# Patient Record
Sex: FEMALE | Race: WHITE | NOT HISPANIC OR LATINO | URBAN - METROPOLITAN AREA
[De-identification: names, ages, dates, MRNs, and addresses within clinical notes are randomized per-mention and may not be internally consistent; named-entity substitution may affect disease eponyms.]

---

## 2017-01-19 ENCOUNTER — COMMUNICATION - HEALTHEAST (OUTPATIENT)
Dept: HEALTH INFORMATION MANAGEMENT | Facility: CLINIC | Age: 37
End: 2017-01-19

## 2017-03-21 ENCOUNTER — OFFICE VISIT - HEALTHEAST (OUTPATIENT)
Dept: FAMILY MEDICINE | Facility: CLINIC | Age: 37
End: 2017-03-21

## 2017-03-21 DIAGNOSIS — J02.0 STREP THROAT: ICD-10-CM

## 2017-03-21 RX ORDER — FLUOXETINE 40 MG/1
CAPSULE ORAL
Status: SHIPPED | COMMUNITY
Start: 2017-01-25

## 2017-07-25 LAB
ABORH_EXT (HISTORICAL CONVERSION): NORMAL
ANTIBODY_EXT (HISTORICAL CONVERSION): NEGATIVE
HBSAG_EXT (HISTORICAL CONVERSION): NORMAL
HGB_EXT (HISTORICAL CONVERSION): 13.1
HIV_EXT: NORMAL
PLT_EXT - HISTORICAL: 386
RPR - HISTORICAL: NORMAL
RUBELLA_EXT (HISTORICAL CONVERSION): NORMAL

## 2018-01-05 ENCOUNTER — RECORDS - HEALTHEAST (OUTPATIENT)
Dept: LAB | Facility: CLINIC | Age: 38
End: 2018-01-05

## 2018-01-05 LAB
ALT SERPL W P-5'-P-CCNC: 15 U/L (ref 0–45)
AST SERPL W P-5'-P-CCNC: 20 U/L (ref 0–40)
BASOPHILS # BLD AUTO: 0.1 THOU/UL (ref 0–0.2)
BASOPHILS NFR BLD AUTO: 1 % (ref 0–2)
CREAT SERPL-MCNC: 0.55 MG/DL (ref 0.6–1.1)
CREAT UR-MCNC: 77.9 MG/DL
EOSINOPHIL # BLD AUTO: 0.1 THOU/UL (ref 0–0.4)
EOSINOPHIL NFR BLD AUTO: 1 % (ref 0–6)
ERYTHROCYTE [DISTWIDTH] IN BLOOD BY AUTOMATED COUNT: 11.2 % (ref 11–14.5)
GFR SERPL CREATININE-BSD FRML MDRD: >60 ML/MIN/1.73M2
HCT VFR BLD AUTO: 34.2 % (ref 35–47)
HGB BLD-MCNC: 11.9 G/DL (ref 12–16)
LYMPHOCYTES # BLD AUTO: 2.7 THOU/UL (ref 0.8–4.4)
LYMPHOCYTES NFR BLD AUTO: 24 % (ref 20–40)
MCH RBC QN AUTO: 30 PG (ref 27–34)
MCHC RBC AUTO-ENTMCNC: 34.8 G/DL (ref 32–36)
MCV RBC AUTO: 86 FL (ref 80–100)
MONOCYTES # BLD AUTO: 0.7 THOU/UL (ref 0–0.9)
MONOCYTES NFR BLD AUTO: 6 % (ref 2–10)
NEUTROPHILS # BLD AUTO: 7.7 THOU/UL (ref 2–7.7)
NEUTROPHILS NFR BLD AUTO: 69 % (ref 50–70)
PLATELET # BLD AUTO: 355 THOU/UL (ref 140–440)
PMV BLD AUTO: 10.1 FL (ref 8.5–12.5)
PROTEIN, RANDOM URINE - HISTORICAL: 17 MG/DL
PROTEIN/CREAT RATIO, RANDOM UR: 0.22
RBC # BLD AUTO: 3.97 MILL/UL (ref 3.8–5.4)
URATE SERPL-MCNC: 3.4 MG/DL (ref 2–7.5)
WBC: 11.3 THOU/UL (ref 4–11)

## 2018-01-12 ENCOUNTER — HOSPITAL ENCOUNTER (OUTPATIENT)
Dept: OBGYN | Facility: CLINIC | Age: 38
Discharge: HOME | End: 2018-01-13
Attending: OBSTETRICS & GYNECOLOGY | Admitting: OBSTETRICS & GYNECOLOGY

## 2018-01-12 DIAGNOSIS — O13.9 GESTATIONAL HYPERTENSION: ICD-10-CM

## 2018-01-12 DIAGNOSIS — Z33.1 PREGNANT STATE, INCIDENTAL: ICD-10-CM

## 2018-01-12 LAB
ABO/RH(D): NORMAL
ALT SERPL W P-5'-P-CCNC: 15 U/L (ref 0–45)
ANTIBODY SCREEN: NEGATIVE
APTT PPP: 25 SECONDS (ref 24–37)
AST SERPL W P-5'-P-CCNC: 20 U/L (ref 0–40)
CREAT SERPL-MCNC: 0.55 MG/DL (ref 0.6–1.1)
CREAT UR-MCNC: 77.4 MG/DL
ERYTHROCYTE [DISTWIDTH] IN BLOOD BY AUTOMATED COUNT: 11.3 % (ref 11–14.5)
GFR SERPL CREATININE-BSD FRML MDRD: >60 ML/MIN/1.73M2
HCT VFR BLD AUTO: 35.2 % (ref 35–47)
HGB BLD-MCNC: 11.8 G/DL (ref 12–16)
INR PPP: 0.96 (ref 0.9–1.1)
MCH RBC QN AUTO: 29.1 PG (ref 27–34)
MCHC RBC AUTO-ENTMCNC: 33.5 G/DL (ref 32–36)
MCV RBC AUTO: 87 FL (ref 80–100)
PLATELET # BLD AUTO: 348 THOU/UL (ref 140–440)
PMV BLD AUTO: 9.8 FL (ref 8.5–12.5)
PROTEIN, RANDOM URINE - HISTORICAL: 24 MG/DL
PROTEIN/CREAT RATIO, RANDOM UR: 0.31
RBC # BLD AUTO: 4.05 MILL/UL (ref 3.8–5.4)
URATE SERPL-MCNC: 3.4 MG/DL (ref 2–7.5)
WBC: 11.9 THOU/UL (ref 4–11)

## 2018-01-12 ASSESSMENT — MIFFLIN-ST. JEOR: SCORE: 1422.04

## 2018-01-13 LAB
ALT SERPL W P-5'-P-CCNC: 13 U/L (ref 0–45)
APTT PPP: 26 SECONDS (ref 24–37)
AST SERPL W P-5'-P-CCNC: 14 U/L (ref 0–40)
CREAT SERPL-MCNC: 0.54 MG/DL (ref 0.6–1.1)
CREAT UR-MCNC: 129.8 MG/DL
ERYTHROCYTE [DISTWIDTH] IN BLOOD BY AUTOMATED COUNT: 11.5 % (ref 11–14.5)
GFR SERPL CREATININE-BSD FRML MDRD: >60 ML/MIN/1.73M2
HCT VFR BLD AUTO: 32.7 % (ref 35–47)
HGB BLD-MCNC: 11.1 G/DL (ref 12–16)
INR PPP: 0.99 (ref 0.9–1.1)
MCH RBC QN AUTO: 29.2 PG (ref 27–34)
MCHC RBC AUTO-ENTMCNC: 33.9 G/DL (ref 32–36)
MCV RBC AUTO: 86 FL (ref 80–100)
PLATELET # BLD AUTO: 328 THOU/UL (ref 140–440)
PMV BLD AUTO: 10 FL (ref 8.5–12.5)
PROTEIN, RANDOM URINE - HISTORICAL: 29 MG/DL
PROTEIN/CREAT RATIO, RANDOM UR: 0.22
RBC # BLD AUTO: 3.8 MILL/UL (ref 3.8–5.4)
URATE SERPL-MCNC: 3.4 MG/DL (ref 2–7.5)
WBC: 9.8 THOU/UL (ref 4–11)

## 2018-01-15 ENCOUNTER — RECORDS - HEALTHEAST (OUTPATIENT)
Dept: LAB | Facility: CLINIC | Age: 38
End: 2018-01-15

## 2018-01-15 LAB
ALT SERPL W P-5'-P-CCNC: 14 U/L (ref 0–45)
AST SERPL W P-5'-P-CCNC: 17 U/L (ref 0–40)
BASOPHILS # BLD AUTO: 0.1 THOU/UL (ref 0–0.2)
BASOPHILS NFR BLD AUTO: 0 % (ref 0–2)
CREAT SERPL-MCNC: 0.58 MG/DL (ref 0.6–1.1)
CREAT UR-MCNC: 162.2 MG/DL
EOSINOPHIL # BLD AUTO: 0.1 THOU/UL (ref 0–0.4)
EOSINOPHIL NFR BLD AUTO: 1 % (ref 0–6)
ERYTHROCYTE [DISTWIDTH] IN BLOOD BY AUTOMATED COUNT: 11.5 % (ref 11–14.5)
GFR SERPL CREATININE-BSD FRML MDRD: >60 ML/MIN/1.73M2
HCT VFR BLD AUTO: 34.7 % (ref 35–47)
HGB BLD-MCNC: 11.7 G/DL (ref 12–16)
LYMPHOCYTES # BLD AUTO: 2.7 THOU/UL (ref 0.8–4.4)
LYMPHOCYTES NFR BLD AUTO: 20 % (ref 20–40)
MCH RBC QN AUTO: 29.5 PG (ref 27–34)
MCHC RBC AUTO-ENTMCNC: 33.7 G/DL (ref 32–36)
MCV RBC AUTO: 87 FL (ref 80–100)
MONOCYTES # BLD AUTO: 0.7 THOU/UL (ref 0–0.9)
MONOCYTES NFR BLD AUTO: 5 % (ref 2–10)
NEUTROPHILS # BLD AUTO: 9.9 THOU/UL (ref 2–7.7)
NEUTROPHILS NFR BLD AUTO: 74 % (ref 50–70)
PLATELET # BLD AUTO: 370 THOU/UL (ref 140–440)
PMV BLD AUTO: 10.5 FL (ref 8.5–12.5)
PROTEIN, RANDOM URINE - HISTORICAL: 55 MG/DL
PROTEIN/CREAT RATIO, RANDOM UR: 0.34
RBC # BLD AUTO: 3.97 MILL/UL (ref 3.8–5.4)
URATE SERPL-MCNC: 3.6 MG/DL (ref 2–7.5)
WBC: 13.5 THOU/UL (ref 4–11)

## 2018-01-23 ENCOUNTER — HOSPITAL ENCOUNTER (OUTPATIENT)
Dept: MEDSURG UNIT | Facility: CLINIC | Age: 38
Discharge: HOME | End: 2018-01-23
Attending: OBSTETRICS & GYNECOLOGY | Admitting: OBSTETRICS & GYNECOLOGY

## 2018-01-23 ENCOUNTER — RECORDS - HEALTHEAST (OUTPATIENT)
Dept: LAB | Facility: CLINIC | Age: 38
End: 2018-01-23

## 2018-01-23 LAB
ALT SERPL W P-5'-P-CCNC: 17 U/L (ref 0–45)
AST SERPL W P-5'-P-CCNC: 22 U/L (ref 0–40)
BASOPHILS # BLD AUTO: 0.1 THOU/UL (ref 0–0.2)
BASOPHILS NFR BLD AUTO: 1 % (ref 0–2)
CREAT SERPL-MCNC: 0.59 MG/DL (ref 0.6–1.1)
CREAT UR-MCNC: 97.9 MG/DL
EOSINOPHIL # BLD AUTO: 0.1 THOU/UL (ref 0–0.4)
EOSINOPHIL NFR BLD AUTO: 1 % (ref 0–6)
ERYTHROCYTE [DISTWIDTH] IN BLOOD BY AUTOMATED COUNT: 11.7 % (ref 11–14.5)
GFR SERPL CREATININE-BSD FRML MDRD: >60 ML/MIN/1.73M2
HCT VFR BLD AUTO: 34.1 % (ref 35–47)
HGB BLD-MCNC: 11.5 G/DL (ref 12–16)
LYMPHOCYTES # BLD AUTO: 2.9 THOU/UL (ref 0.8–4.4)
LYMPHOCYTES NFR BLD AUTO: 23 % (ref 20–40)
MCH RBC QN AUTO: 29 PG (ref 27–34)
MCHC RBC AUTO-ENTMCNC: 33.7 G/DL (ref 32–36)
MCV RBC AUTO: 86 FL (ref 80–100)
MONOCYTES # BLD AUTO: 0.6 THOU/UL (ref 0–0.9)
MONOCYTES NFR BLD AUTO: 5 % (ref 2–10)
NEUTROPHILS # BLD AUTO: 8.6 THOU/UL (ref 2–7.7)
NEUTROPHILS NFR BLD AUTO: 70 % (ref 50–70)
PLATELET # BLD AUTO: 371 THOU/UL (ref 140–440)
PMV BLD AUTO: 10.1 FL (ref 8.5–12.5)
PROTEIN, RANDOM URINE - HISTORICAL: 34 MG/DL
PROTEIN/CREAT RATIO, RANDOM UR: 0.35
RBC # BLD AUTO: 3.96 MILL/UL (ref 3.8–5.4)
URATE SERPL-MCNC: 4 MG/DL (ref 2–7.5)
WBC: 12.4 THOU/UL (ref 4–11)

## 2018-01-23 ASSESSMENT — MIFFLIN-ST. JEOR: SCORE: 1417.51

## 2018-01-24 ENCOUNTER — HOSPITAL ENCOUNTER (OUTPATIENT)
Dept: ULTRASOUND IMAGING | Facility: CLINIC | Age: 38
Discharge: HOME | End: 2018-01-24
Attending: OBSTETRICS & GYNECOLOGY

## 2018-01-24 ENCOUNTER — RECORDS - HEALTHEAST (OUTPATIENT)
Dept: ADMINISTRATIVE | Facility: OTHER | Age: 38
End: 2018-01-24

## 2018-01-24 DIAGNOSIS — O14.90 PREECLAMPSIA: ICD-10-CM

## 2018-01-25 ENCOUNTER — ANESTHESIA - HEALTHEAST (OUTPATIENT)
Dept: OBGYN | Facility: CLINIC | Age: 38
End: 2018-01-25

## 2018-01-26 ENCOUNTER — SURGERY - HEALTHEAST (OUTPATIENT)
Dept: OBGYN | Facility: CLINIC | Age: 38
End: 2018-01-26

## 2018-01-26 ASSESSMENT — MIFFLIN-ST. JEOR: SCORE: 1417.51

## 2018-01-29 ENCOUNTER — HOME CARE/HOSPICE - HEALTHEAST (OUTPATIENT)
Dept: HOME HEALTH SERVICES | Facility: HOME HEALTH | Age: 38
End: 2018-01-29

## 2018-01-31 ENCOUNTER — HOME CARE/HOSPICE - HEALTHEAST (OUTPATIENT)
Dept: HOME HEALTH SERVICES | Facility: HOME HEALTH | Age: 38
End: 2018-01-31

## 2018-03-20 ENCOUNTER — RECORDS - HEALTHEAST (OUTPATIENT)
Dept: ADMINISTRATIVE | Facility: OTHER | Age: 38
End: 2018-03-20

## 2018-04-10 ENCOUNTER — OFFICE VISIT - HEALTHEAST (OUTPATIENT)
Dept: FAMILY MEDICINE | Facility: CLINIC | Age: 38
End: 2018-04-10

## 2018-04-10 ENCOUNTER — COMMUNICATION - HEALTHEAST (OUTPATIENT)
Dept: SCHEDULING | Facility: CLINIC | Age: 38
End: 2018-04-10

## 2018-04-10 DIAGNOSIS — G44.209 TENSION HEADACHE: ICD-10-CM

## 2018-04-10 RX ORDER — AZITHROMYCIN 250 MG/1
TABLET, FILM COATED ORAL
Refills: 0 | Status: SHIPPED | COMMUNITY
Start: 2018-03-20

## 2018-04-10 RX ORDER — DEXTROAMPHETAMINE SACCHARATE, AMPHETAMINE ASPARTATE MONOHYDRATE, DEXTROAMPHETAMINE SULFATE AND AMPHETAMINE SULFATE 7.5; 7.5; 7.5; 7.5 MG/1; MG/1; MG/1; MG/1
CAPSULE, EXTENDED RELEASE ORAL
Status: SHIPPED | COMMUNITY
Start: 2018-03-20

## 2018-04-10 RX ORDER — METRONIDAZOLE 7.5 MG/G
GEL TOPICAL
Refills: 3 | Status: SHIPPED | COMMUNITY
Start: 2018-03-20 | End: 2021-12-30

## 2018-04-10 ASSESSMENT — MIFFLIN-ST. JEOR: SCORE: 1399.36

## 2018-08-15 ENCOUNTER — COMMUNICATION - HEALTHEAST (OUTPATIENT)
Dept: HEALTH INFORMATION MANAGEMENT | Facility: CLINIC | Age: 38
End: 2018-08-15

## 2018-08-15 ENCOUNTER — COMMUNICATION - HEALTHEAST (OUTPATIENT)
Dept: TELEHEALTH | Facility: CLINIC | Age: 38
End: 2018-08-15

## 2018-09-15 ENCOUNTER — COMMUNICATION - HEALTHEAST (OUTPATIENT)
Dept: SCHEDULING | Facility: CLINIC | Age: 38
End: 2018-09-15

## 2018-11-23 ENCOUNTER — AMBULATORY - HEALTHEAST (OUTPATIENT)
Dept: NURSING | Facility: CLINIC | Age: 38
End: 2018-11-23

## 2021-05-30 VITALS — BODY MASS INDEX: 21.93 KG/M2 | WEIGHT: 135.9 LBS

## 2021-05-31 VITALS — WEIGHT: 160 LBS | HEIGHT: 66 IN | BODY MASS INDEX: 25.71 KG/M2

## 2021-05-31 VITALS — HEIGHT: 66 IN | BODY MASS INDEX: 25.71 KG/M2 | WEIGHT: 160 LBS

## 2021-05-31 VITALS — HEIGHT: 66 IN | BODY MASS INDEX: 25.88 KG/M2 | WEIGHT: 161 LBS

## 2021-06-01 VITALS — HEIGHT: 66 IN | WEIGHT: 156 LBS | BODY MASS INDEX: 25.07 KG/M2

## 2021-06-09 NOTE — PROGRESS NOTES
ASSESSMENT:   1. Strep throat  Rapid Strep A Screen-Throat    amoxicillin (AMOXIL) 500 MG capsule        PLAN:  Rx: amoxicillin for strep throat.   Change toothbrush in 24 hours.  Contagious for 24 hours after starting antibiotic.  May use tylenol or ibuprofen for fever/discomfort.    F/u with PCP if not improving in 3-5 days, sooner if worsening.      SUBJECTIVE:   Catherine Davis is a 36 y.o. female presents today with 1 days complaint of sore throat. She also complains of headache, fatigue, body aches. Sick contacts: none. Has tried no medications for relief.     Denies fever, cough, nasal congestion, vomiting, diarrhea.    Patient Active Problem List   Diagnosis     Amenorrhea     Pregnancy     Pregnant      delivery delivered     Breech presentation     Breech presentation delivered       History   Smoking Status     Never Smoker   Smokeless Tobacco     Never Used       Current Medications:  Current Outpatient Prescriptions on File Prior to Visit   Medication Sig Dispense Refill     prenatal multivit-Ca-min-Fe-FA (PRENATAL VITAMIN) Tab Take by mouth.       No current facility-administered medications on file prior to visit.        Allergies:   No Known Allergies    OBJECTIVE:   Vitals:    17   BP: 114/62   Pulse: 70   Resp: 22   Temp: 98.3  F (36.8  C)   TempSrc: Oral   SpO2: 100%   Weight: 135 lb 14.4 oz (61.6 kg)     Physical exam reveals a pleasant 36 y.o. female.   Appears healthy, alert and cooperative.  Eyes:  No conjunctivitis, lids normal.   Ears:  normal TMs bilaterally  Nose:    Mucosa normal.   Mouth:  Mucosa pink and moist.mild erythema of posterior pharynx. No exudate or palatal petechiae.   Lymph: small anterior cervical LAD  Lungs: Chest is clear, no wheezing, rhonchi or rales. Symmetric air entry throughout both lung fields.  Heart: regular rate and rhythm, no murmur, rub or gallop    Recent Results (from the past 24 hour(s))   Rapid Strep A Screen-Throat   Result Value Ref  Range    Rapid Strep A Antigen Group A Strep detected (!) No Group A Strep detected

## 2021-06-15 NOTE — PROGRESS NOTES
Dr. Nieto notified of increase in BP readings. Verbal orders received from her to start po Labetolol BID and keep the patient overnight to montitor BP and reassess in the morning.

## 2021-06-15 NOTE — ANESTHESIA POSTPROCEDURE EVALUATION
Patient: Catherine Davis   REPEAT  SECTION  Anesthesia type: spinal    Patient location: PACU  Last vitals:   Vitals:    18 0957   BP: 108/59   Pulse: 71   Resp:    Temp:    SpO2:      Post vital signs: stable  Level of consciousness: awake and responds to simple questions  Post-anesthesia pain: pain controlled  Post-anesthesia nausea and vomiting: no  Pulmonary: unassisted, return to baseline  Cardiovascular: stable and blood pressure at baseline  Hydration: adequate  Anesthetic events: no    QCDR Measures:  ASA# 11 - Miroslava-op Cardiac Arrest: ASA11B - Patient did NOT experience unanticipated cardiac arrest  ASA# 12 - Miroslava-op Mortality Rate: ASA12B - Patient did NOT die  ASA# 13 - PACU Re-Intubation Rate: NA - No ETT / LMA used for case  ASA# 10 - Composite Anes Safety: ASA10A - No serious adverse event    Additional Notes:

## 2021-06-15 NOTE — PROGRESS NOTES
Pt verbalized understanding of home instructions and follow up plans for Monday at 4pm in the clinic.  Pres[facility] and pt was planning to pick it up now.

## 2021-06-15 NOTE — H&P
"LATE ENTRY FROM     OB HISTORY AND PHYSICAL UPDATE ADMISSION EXAM    Name:  Catherine Davis  YOB: 1980  Medical Record Number: [id number]    History of Present Illness:  Pt seen in  Clinic with elevated BP's.  The patient denies severe headache, visual changes, epigastric or RUQ pain, nausea, or vomiting.    Urine prot/creat ratio >0.3, consistent with preeclampsia.    Estimated Date of Delivery: 18                       EGA 36w1d    OB History    Para Term  AB Living   3 2 1   2   SAB TAB Ectopic Multiple Live Births       2      # Outcome Date GA Lbr Stef/2nd Weight Sex Delivery Anes PTL Lv   3 Current            2 Term 06/23/15 39w0d  7 lb 7 oz (3.374 kg) M CS-LVertical Spinal Y GAVIN      Complications: Breech birth   1 Para 13   6 lb 10.5 oz (3.019 kg) M Vag-Vacuum EPI N GAVIN          Prenatal Complications Hypertension and Depression/anxiety  Exam:    /69 (Patient Position: Lying, Cuff Size: Adult Regular)  Pulse 67  Temp 98.1  F (36.7  C) (Oral)   Resp 18  Ht 5' 6\" (1.676 m)  Wt 161 lb (73 kg)  LMP 2017  BMI 25.99 kg/m2    Fetal Heart Rate Category 1   Contractions rare        HEENT          WNL              Heart              WNL                Lungs             WNL                       Abdomen        WNL                       Extremities     WNL                       Assessment: 38 yo  at 34 wks, here with elevated BP's and new diagnosis of preeclampsia    Plan:   -Admit for obs to L&D overnight  -Start Labetalol 100 mg PO BID  -If BP's normalize, labs remain normal and pt is asymptomatic, will likely d/c to home tomorrow. Pt has twice wkly BPP/NST's scheduled in clinic  -will move  to 37 wks due to preeclampsia      Suyapa Nieto MD    "

## 2021-06-15 NOTE — ANESTHESIA PREPROCEDURE EVALUATION
Anesthesia Evaluation      Patient summary reviewed     Airway   Neck ROM: full   Pulmonary - negative ROS and normal exam    breath sounds clear to auscultation                         Cardiovascular - normal exam  (+) hypertension poorly controlled, ,     Rhythm: regular  Rate: normal,         Neuro/Psych - negative ROS     Endo/Other - negative ROS      GI/Hepatic/Renal - negative ROS      Other findings: Patient states she has pre-eclampsia. I will await the coag profile      Dental - normal exam                        Anesthesia Plan  Planned anesthetic: spinal  SAB if the coag profile is normal  ASA 3     Anesthetic plan and risks discussed with: patient    Post-op plan: routine recovery

## 2021-06-15 NOTE — ANESTHESIA PROCEDURE NOTES
Spinal Block    Patient location during procedure: OR  Start time: 1/26/2018 8:40 AM  End time: 1/26/2018 8:45 AM  Reason for block: primary anesthetic    Staffing:  Performing  Anesthesiologist: ESTHER KYLE    Preanesthetic Checklist  Completed: patient identified, risks, benefits, and alternatives discussed, timeout performed, consent obtained, airway assessed, oxygen available, suction available, emergency drugs available and hand hygiene performed  Spinal Block  Patient position: sitting  Prep: ChloraPrep  Patient monitoring: heart rate, cardiac monitor, continuous pulse ox and blood pressure  Approach: midline  Location: L3-4  Injection technique: single-shot  Needle type: pencil-tip   Needle gauge: 24 G    Assessment  Sensory level: T6

## 2021-06-15 NOTE — PROGRESS NOTES
Dr. Daigle was phoned and updated on BPs during the night, pt asymptomatic for precclampsia.  Orders obtained for lab work, Dr. Daigle will be in mid morning.

## 2021-06-15 NOTE — ANESTHESIA CARE TRANSFER NOTE
Last vitals:   Vitals:    01/26/18 0947   BP: 107/66   Pulse: 67   Resp: 14   Temp: 36.5  C (97.7  F)   SpO2: 98%     Patient's level of consciousness is awake  Spontaneous respirations: yes  Maintains airway independently: yes  Dentition unchanged: yes  Oropharynx: oropharynx clear of all foreign objects    QCDR Measures:  ASA# 20 - Surgical Safety Checklist: WHO surgical safety checklist completed prior to induction  PQRS# 430 - Adult PONV Prevention: 4558F - Pt received => 2 anti-emetic agents (different classes) preop & intraop  ASA# 8 - Peds PONV Prevention: 4558F - Pt received => 2 anti-emetic agents (different classes) preop & intraop  PQRS# 424 - Miroslava-op Temp Management: 4559F - At least one body temp DOCUMENTED => 35.5C or 95.9F within required timeframe  PQRS# 426 - PACU Transfer Protocol: - Transfer of care checklist used  ASA# 14 - Acute Post-op Pain: ASA14B - Patient did NOT experience pain >= 7 out of 10   Patient alert and orientated, SAB intact

## 2021-06-15 NOTE — PROGRESS NOTES
Pt. Arrived from clinic with elevated blood pressures and history of gestational hypertension with this pregnancy only. Patient denies leaking of fluid, vaginal bleeding, pain, visual disturbances, headache and epigastric pain. Reflexes normotensive. 1 beat of clonus noted on right leg. Serial blood pressures in progress. EFM applied. Labs ordered.

## 2021-06-15 NOTE — DISCHARGE SUMMARY
"HOSPITAL DISCHARGE SUMMARY - Antepartum    Patient Name: Catherine Davis   YOB: 1980  Age: 37 y.o.  Medical Record Number: [id number]  Admission Date: 1/12/2018  Discharge Date: 1/13/2018    Catherine Davis will be discharged from Hospital to home. She feels well and has no emotional concerns. She has an appointment scheduled in 2 days.  Her BP is normal on the Labetalol. PI labs wnl today.    /69 (Patient Position: Lying, Cuff Size: Adult Regular)  Pulse 67  Temp 98.1  F (36.7  C) (Oral)   Resp 18  Ht 5' 6\" (1.676 m)  Wt 161 lb (73 kg)  LMP 03/17/2017  BMI 25.99 kg/m2    REASON FOR ADMISSION: Labor and Delivery    MEDS: see  Med rec    PROCEDURES: Vaginal delivery    HISTORY OF PRESENT ILLNESS AND HOSPITAL COURSE: This is a 37 y.o. female who underwent monitoring and serial BPs.  BP stabilized on Labetalol.     DISCHARGE INSTRUCTION: Discharge to home with instructions to follow up in the office in 2 days.  Modified bedrest. Patient states she understands.     Payton Daigle MD        "

## 2021-06-15 NOTE — PROGRESS NOTES
Patient discussed that since she's had lower blood pressure findings since last discussion with Dr. Nieto, that she would rather be monitored for a few more hours to see if she would have to stay over the entire night. Called Dr. Nieto on patient's concerns and request. She wasn't available to answer and will call back when available. Estefani Thomas 01/12/18 4:38 PM

## 2021-06-15 NOTE — PROGRESS NOTES
Dr. Nieto notified of gestational hypertension assessment, BP's, labs and category 1 FHR tracing. Order received to observe for two more hours, then discharge to home if BP stable. Plan to move scheduled C/S to when patient is 37 weeks.

## 2021-06-16 PROBLEM — O14.90 PREECLAMPSIA: Status: ACTIVE | Noted: 2018-01-26

## 2021-06-17 NOTE — PROGRESS NOTES
Assessment/Plan:         Visit Diagnoses     Tension headache    -neurologic exam is normal.  No evidence for serious intracranial pathology.  Recommended heat and stretching exercises.  We did discuss gabapentin but I am concerned about that with nursing.  I have encouraged her to follow-up promptly if she develops focal neurologic symptoms.        Patient Instructions   Apply heat for 10-15 minutes followed by stretching exercises twice daily   For 14 days.  Continue stretching exercises at least 3 times a day long-term.  Ibuprofen as needed but try to limit to not more than 2-3 days per week.  May alternate with Tylenol, again limiting to not more than 2-3 days per week.  Drink plenty of fluids.  Follow-up if you develop new neurologic symptoms.       Subjective:   Catherine Davis is a 37 y.o. female who presents today with concerns about headaches.  She does have issues with chronic headaches but has had three more severe headaches, about once a week for the past three weeks.  Most recently she had a headache that started last night and has been persistent today.  The headache is bilateral and is described as pulsing.  She had nausea with these more severe headaches.  No emesis.  She is sensitive to light and sound with the headaches.  She does have daily headaches that are more mild.  She takes ibuprofen about three times per day for the past week.  No fever.  No sore throat or congestion.  She has had some blurring of her vision.  No double vision.  She is tired but no other focal weakness.  She drinks 1-2 caffeinated drinks per day.  No alcohol use.  No tobacco use.  She has a baby that is 2 1/2 months old.  She is breast feeding.      Patient Active Problem List   Diagnosis      delivery delivered     Preeclampsia        Past Medical History:   Diagnosis Date     Hypertension      Mental disorder     History of anxiety and depression         Past Surgical History:   Procedure Laterality Date      " SECTION N/A 2018    Procedure:  REPEAT  SECTION;  Surgeon: Suyapa Nieto MD;  Location: [facility] L+D OR;  Service:       SECTION, LOW TRANSVERSE       MD DENTAL SURGERY PROCEDURE      Description: Oral Surgery Tooth Extraction;  Recorded: 2012;          Current Outpatient Prescriptions:      azithromycin (ZITHROMAX) 250 MG tablet, TK 1 T PO BID FOR 3 WKS, Disp: , Rfl: 0     dextroamphetamine-amphetamine (ADDERALL XR) 30 MG 24 hr capsule, Earliest Fill Date: 3/20/18 Take 1 capsule by mouth twice daily., Disp: , Rfl:      FLUoxetine (PROZAC) 40 MG capsule, Take 1 capsule by mouth every morning., Disp: , Rfl:      ibuprofen (ADVIL,MOTRIN) 600 MG tablet, Take 1 tablet (600 mg total) by mouth every 6 (six) hours as needed for pain., Disp: 20 tablet, Rfl: 0     metroNIDAZOLE (METROGEL) 0.75 % gel, SANCHEZ EXT AA BID PRN, Disp: , Rfl: 3      Review of Systems     Objective:     Vitals:    04/10/18 1543   BP: 112/76   Patient Site: Left Arm   Patient Position: Sitting   Cuff Size: Adult Regular   Pulse: 80   Resp: 16   Temp: 98.2  F (36.8  C)   TempSrc: Oral   SpO2: 99%   Weight: 156 lb (70.8 kg)   Height: 5' 6\" (1.676 m)        Physical Exam   Constitutional: She is oriented to person, place, and time. She appears well-developed and well-nourished. No distress.   HENT:   Right Ear: Tympanic membrane and ear canal normal.   Left Ear: Tympanic membrane and ear canal normal.   Mouth/Throat: Oropharynx is clear and moist.   Eyes: Conjunctivae are normal.   Neck: Normal range of motion. Neck supple. No thyromegaly present.   Cardiovascular: Normal rate and regular rhythm.    No murmur heard.  Pulmonary/Chest: Effort normal and breath sounds normal. No respiratory distress. She has no wheezes. She has no rales.   Lymphadenopathy:     She has no cervical adenopathy.   Neurological: She is alert and oriented to person, place, and time. She has normal strength. No cranial nerve deficit. She " displays a negative Romberg sign. Gait normal.   Reflex Scores:       Brachioradialis reflexes are 2+ on the right side and 2+ on the left side.       Patellar reflexes are 2+ on the right side and 2+ on the left side.  Psychiatric: She has a normal mood and affect.

## 2021-08-21 ENCOUNTER — HEALTH MAINTENANCE LETTER (OUTPATIENT)
Age: 41
End: 2021-08-21

## 2021-10-16 ENCOUNTER — HEALTH MAINTENANCE LETTER (OUTPATIENT)
Age: 41
End: 2021-10-16

## 2021-12-13 ENCOUNTER — IMMUNIZATION (OUTPATIENT)
Dept: FAMILY MEDICINE | Facility: CLINIC | Age: 41
End: 2021-12-13
Payer: COMMERCIAL

## 2021-12-13 PROCEDURE — 90471 IMMUNIZATION ADMIN: CPT

## 2021-12-13 PROCEDURE — 90686 IIV4 VACC NO PRSV 0.5 ML IM: CPT

## 2021-12-30 ENCOUNTER — E-VISIT (OUTPATIENT)
Dept: FAMILY MEDICINE | Facility: CLINIC | Age: 41
End: 2021-12-30
Payer: COMMERCIAL

## 2021-12-30 DIAGNOSIS — G43.909 MIGRAINE WITHOUT STATUS MIGRAINOSUS, NOT INTRACTABLE, UNSPECIFIED MIGRAINE TYPE: Primary | ICD-10-CM

## 2021-12-30 PROCEDURE — 99421 OL DIG E/M SVC 5-10 MIN: CPT | Performed by: FAMILY MEDICINE

## 2021-12-30 RX ORDER — SUMATRIPTAN 100 MG/1
100 TABLET, FILM COATED ORAL
Qty: 18 TABLET | Refills: 1 | Status: SHIPPED | OUTPATIENT
Start: 2021-12-30

## 2021-12-30 NOTE — PATIENT INSTRUCTIONS
Thank you for choosing us for your care. I have placed an order for a prescription so that you can start treatment. View your full visit summary for details by clicking on the link below. Your pharmacist will able to address any questions you may have about the medication.     If you're not feeling better within 5-7 days, please schedule an appointment.  You can schedule an appointment right here in [facility], or call [phone]  If the visit is for the same symptoms as your eVisit, we'll refund the cost of your eVisit if seen within seven days.      Migraine Headache   A migraine headache is an often severe type of headache. It's different from other types of headaches in that symptoms other than pain occur with the it. For instance, a classic migraine headache means visual symptoms (or aura) such as flashes of light, blind spots or other vision changes, warns you a headache is coming on. Nausea and vomiting, lightheadedness, sensitivity to light or sound, and other visual disturbances are common migraine symptoms. The pain may last from a few hours to several days. It's not clear why migraines occur, but certain factors called triggers can raise the risk of having a migraine attack. A migraine may be triggered by emotional stress or depression, or by hormone changes during the menstrual cycle. Other triggers include certain birth control pills, overuse of migraine medicines, alcohol or caffeine, foods with tyramine such as aged cheese and wine, eyestrain, weather changes, missed meals, or too little or too much sleep.  Home care  Follow these tips when taking care of yourself at home:    Don t drive yourself home if you were given pain medicine for your headache or are having visual symptoms. Instead, have someone else drive you home. Try to sleep when you get home. You should feel much better when you wake up.    Cold can help ease migraine symptoms. Put an ice pack wrapped in a thin towel on your forehead or  at the base of your skull. Put heat on the back of your neck to help ease any neck spasm.    Drink only clear liquids or eat a light diet until your symptoms get better. This will help you prevent nausea and vomiting.  How to prevent migraines  Pay attention to what seems to trigger your headache. Try to stay away from the triggers when you can. If you have headaches often, consider keeping a headache diary. In it, write down what you were doing, feeling, or eating in the hours before each headache. Show this to your healthcare provider to help find the cause of your headaches.  If stress seems to be a trigger for your headaches, figure out what is causing stress in your life. Learn new ways to handle your stress. Ideas include regular exercise, biofeedback, self-hypnosis, yoga, and meditation. Talk with your healthcare provider to find out more information about managing stress. Many books and digital media are also available on this subject.  Tyramine is a substance found in many foods. It can trigger a migraine in some people. These foods contain tyramine:    Chocolate    Yogurt    All cheeses, but especially aged cheeses    Smoked or pickled fish and meat, including herring, caviar, bologna, pepperoni, and salami    Liver    Avocados    Bananas    Figs    Raisins    Red wine  Try staying away from these foods for 1 to 2 months to see if you have fewer headaches.  How to treat future headaches    Take time out at the first sign of a headache, if possible. Find a quiet, dark, comfortable place to sit or lie down. Let yourself relax or sleep.    Put an ice pack wrapped in a thin towel on your forehead or on the area of greatest pain. A heating pad and massage may help if you are having a muscle spasm and tightness in your neck.    If you have been prescribed a medicine to stop a migraine headache, use this at the first warning sign of the headache for best results. First signs may be an aura or pain.    If you have  been prescribed a medicine to prevent the headaches, it's important to take the medicine as directed. Many of these medicines may take a few weeks to start preventing headaches, so it's important to not give up on them right away. If you continue to have just as many headaches after taking these medicines for a while, talk with your doctor to see if the dose needs to be changed or if a different medicine is advised.    If you need to take medicine often for your migraine, talk with your healthcare provider about other ways to prevent your headaches.    Follow-up care  Follow up with your healthcare provider, or as advised. Talk with your provider if you have frequent headaches. He or she can figure out a treatment plan. Ask if you can have medicine to take at home the next time you get a bad headache. This may keep you from having to visit the emergency department in the future. You may need to see a headache specialist (neurologist) if you continue to have headaches.  When to seek medical advice  Call your healthcare provider right away if any of these occur:    Your head pain gets worse, or doesn t get better within 24 hours    You can t keep liquids down (repeated vomiting)    Pain in your sinuses, ears, or throat    Fever of 100.4  F (38  C) or higher, or as directed by your healthcare provider    Stiff neck    Extreme drowsiness, confusion, or fainting    Dizziness, or dizziness with spinning sensation (vertigo)    Weakness or trouble feeling in an arm or leg, or on one side of your face    Trouble talking or seeing  [employer] last reviewed this educational content on 9/1/2019 2000-2021 [employer]. All rights reserved. This information is not intended as a substitute for professional medical care. Always follow your healthcare professional's instructions.

## 2022-09-25 ENCOUNTER — HEALTH MAINTENANCE LETTER (OUTPATIENT)
Age: 42
End: 2022-09-25

## 2023-10-15 ENCOUNTER — HEALTH MAINTENANCE LETTER (OUTPATIENT)
Age: 43
End: 2023-10-15

## 2024-03-02 ENCOUNTER — HEALTH MAINTENANCE LETTER (OUTPATIENT)
Age: 44
End: 2024-03-02

## 2024-12-07 ENCOUNTER — HEALTH MAINTENANCE LETTER (OUTPATIENT)
Age: 44
End: 2024-12-07

## 2025-08-08 ENCOUNTER — MYC MEDICAL ADVICE (OUTPATIENT)
Dept: FAMILY MEDICINE | Facility: CLINIC | Age: 45
End: 2025-08-08
Payer: COMMERCIAL